# Patient Record
Sex: FEMALE
[De-identification: names, ages, dates, MRNs, and addresses within clinical notes are randomized per-mention and may not be internally consistent; named-entity substitution may affect disease eponyms.]

---

## 2021-10-21 ENCOUNTER — RESULT REVIEW (OUTPATIENT)
Age: 51
End: 2021-10-21

## 2022-09-28 PROBLEM — Z00.00 ENCOUNTER FOR PREVENTIVE HEALTH EXAMINATION: Status: ACTIVE | Noted: 2022-09-28

## 2022-10-05 ENCOUNTER — APPOINTMENT (OUTPATIENT)
Dept: RADIATION ONCOLOGY | Facility: CLINIC | Age: 52
End: 2022-10-05

## 2022-10-05 VITALS — WEIGHT: 140 LBS | RESPIRATION RATE: 18 BRPM | HEIGHT: 60 IN | BODY MASS INDEX: 27.48 KG/M2

## 2022-10-05 DIAGNOSIS — Z78.9 OTHER SPECIFIED HEALTH STATUS: ICD-10-CM

## 2022-10-05 PROCEDURE — 77263 THER RADIOLOGY TX PLNG CPLX: CPT

## 2022-10-05 PROCEDURE — 99443: CPT | Mod: 25

## 2022-10-05 RX ORDER — MULTIVIT-MIN/FOLIC/VIT K/LYCOP 400-300MCG
25 MCG TABLET ORAL
Refills: 0 | Status: ACTIVE | COMMUNITY

## 2022-10-05 RX ORDER — 70%ISOPROPYL ALCOHOL 0.7 ML/ML
70 SWAB TOPICAL
Refills: 0 | Status: ACTIVE | COMMUNITY

## 2022-10-05 NOTE — DISEASE MANAGEMENT
[Clinical] : TNM Stage: c [I] : I [FreeTextEntry4] : IDC Right breast [TTNM] : x [NTNM] : x [MTNM] : 0

## 2022-10-05 NOTE — REASON FOR VISIT
[Consideration of Curative Therapy] : consideration of curative therapy for [Breast Cancer] : breast cancer [Home] : at home, [unfilled] , at the time of the visit. [Medical Office: ([facility])___] : at the medical office located in  [Verbal consent obtained from patient] : the patient, [unfilled] [[employer] ] : provided by [employer]   [Interpreters_IDNumber] : [id number] [TWNoteComboBox1] : Peruvian

## 2022-10-05 NOTE — HISTORY OF PRESENT ILLNESS
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 \par \par Her biopsy pathology 10/21/21 includes DCIS and the invasive tumor is less than 1 millimeter. The dcis is high grade and involves lobules. After her initial bipsy, She has had ultrasound and bilateral breast MRI in 11/2021 which revealed a 2 centimeter right axillary lymph node with a questionable thickened cortex after a recent procedure, most likely reactive.\par \par 2/2022 -- right breast lumpectomy. DCIS. Stage [id number]. Triple Neg. \par 4/2022 --- chemotherapy with Dr Cline @ [facility]. \par \par 10/5/22 Today for radiation consult. No breast pain, chest pain, cough.

## 2022-10-10 ENCOUNTER — OUTPATIENT (OUTPATIENT)
Dept: OUTPATIENT SERVICES | Facility: HOSPITAL | Age: 52
LOS: 1 days | Discharge: HOME | End: 2022-10-10

## 2022-10-10 DIAGNOSIS — C50.811 MALIGNANT NEOPLASM OF OVERLAPPING SITES OF RIGHT FEMALE BREAST: ICD-10-CM

## 2022-10-10 PROCEDURE — 77333 RADIATION TREATMENT AID(S): CPT | Mod: 26

## 2022-10-10 PROCEDURE — 77290 THER RAD SIMULAJ FIELD CPLX: CPT | Mod: 26

## 2022-10-27 ENCOUNTER — NON-APPOINTMENT (OUTPATIENT)
Age: 52
End: 2022-10-27

## 2022-10-31 NOTE — REVIEW OF SYSTEMS
[Dysphagia: Grade 0] : Dysphagia: Grade 0 [Cough: Grade 0] : Cough: Grade 0 [Alopecia: Grade 0] : Alopecia: Grade 0 [Pruritus: Grade 0] : Pruritus: Grade 0 [Skin Atrophy: Grade 0] : Skin Atrophy: Grade 0 [Skin Hyperpigmentation: Grade 0] : Skin Hyperpigmentation: Grade 0 [Skin Induration: Grade 0] : Skin Induration: Grade 0 [Dermatitis Radiation: Grade 0] : Dermatitis Radiation: Grade 0 [Negative] : Allergic/Immunologic [de-identified] : h/o right breast lumpectomy in 2/2022

## 2022-10-31 NOTE — HISTORY OF PRESENT ILLNESS
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 \par \par Her biopsy pathology 10/21/21 includes DCIS and the invasive tumor is less than 1 millimeter. The dcis is high grade and involves lobules. After her initial bipsy, She has had ultrasound and bilateral breast MRI in 11/2021 which revealed a 2 centimeter right axillary lymph node with a questionable thickened cortex after a recent procedure, most likely reactive.\par \par 2/2022 -- right breast lumpectomy. DCIS. Stage [id number]. Triple Neg. \par 4/2022 --- chemotherapy with Dr Cline @ [facility]. \par \par 10/5/22 Today for radiation consult. No breast pain, chest pain, cough. \par \par 3/20 fractions of Radiation to right breast completed. No breast pain, chest pain, cough, dysphagia. Using Aquaphor cream over RT area.

## 2022-10-31 NOTE — REASON FOR VISIT
[Breast Cancer] : breast cancer [[employer] ] : provided by [employer]   [Routine On-Treatment] : a routine on-treatment visit for [Interpreters_IDNumber] : [id number] [TWNoteComboBox1] : Burundian

## 2022-11-01 ENCOUNTER — NON-APPOINTMENT (OUTPATIENT)
Age: 52
End: 2022-11-01

## 2022-11-06 NOTE — REASON FOR VISIT
[Routine On-Treatment] : a routine on-treatment visit for [Breast Cancer] : breast cancer [[employer] ] : provided by [employer]   [Interpreters_IDNumber] : [id number] [TWNoteComboBox1] : Russian

## 2022-11-06 NOTE — REVIEW OF SYSTEMS
[Dysphagia: Grade 0] : Dysphagia: Grade 0 [Cough: Grade 0] : Cough: Grade 0 [Alopecia: Grade 0] : Alopecia: Grade 0 [Pruritus: Grade 0] : Pruritus: Grade 0 [Skin Atrophy: Grade 0] : Skin Atrophy: Grade 0 [Skin Hyperpigmentation: Grade 0] : Skin Hyperpigmentation: Grade 0 [Skin Induration: Grade 0] : Skin Induration: Grade 0 [Dermatitis Radiation: Grade 0] : Dermatitis Radiation: Grade 0 [Negative] : Allergic/Immunologic [de-identified] : h/o right breast lumpectomy in 2/2022

## 2022-11-06 NOTE — DISEASE MANAGEMENT
[Clinical] : TNM Stage: c [I] : I [FreeTextEntry4] : IDC Right breast [TTNM] : x [NTNM] : x [MTNM] : 0 [de-identified] : 5240 cGy [de-identified] : right breast

## 2022-11-06 NOTE — HISTORY OF PRESENT ILLNESS
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 \par \par Her biopsy pathology 10/21/21 includes DCIS and the invasive tumor is less than 1 millimeter. The dcis is high grade and involves lobules. After her initial bipsy, She has had ultrasound and bilateral breast MRI in 11/2021 which revealed a 2 centimeter right axillary lymph node with a questionable thickened cortex after a recent procedure, most likely reactive.\par \par 2/2022 -- right breast lumpectomy. DCIS. Stage [id number]. Triple Neg. \par 4/2022 --- chemotherapy with Dr Cline @ [facility]. \par \par 10/5/22 Today for radiation consult. No breast pain, chest pain, cough. \par \par 7/20 fractions of Radiation to right breast completed. No breast pain, chest pain, cough, dysphagia. Using Aquaphor cream over RT area.

## 2022-11-10 ENCOUNTER — NON-APPOINTMENT (OUTPATIENT)
Age: 52
End: 2022-11-10

## 2022-11-12 NOTE — HISTORY OF PRESENT ILLNESS
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 \par \par Her biopsy pathology 10/21/21 includes DCIS and the invasive tumor is less than 1 millimeter. The dcis is high grade and involves lobules. After her initial bipsy, She has had ultrasound and bilateral breast MRI in 11/2021 which revealed a 2 centimeter right axillary lymph node with a questionable thickened cortex after a recent procedure, most likely reactive.\par \par 2/2022 -- right breast lumpectomy. DCIS. Stage [id number]. Triple Neg. \par 4/2022 --- chemotherapy with Dr Cline @ [facility]. \par \par 10/5/22 Today for radiation consult. No breast pain, chest pain, cough. \par \par 13/20 fractions of Radiation to right breast completed. No breast pain, chest pain, cough, dysphagia. Using Aquaphor cream over RT area.

## 2022-11-12 NOTE — DISEASE MANAGEMENT
[Clinical] : TNM Stage: c [I] : I [FreeTextEntry4] : IDC Right breast [TTNM] : x [NTNM] : x [MTNM] : 0 [de-identified] : 5240 cGy [de-identified] : right breast

## 2022-11-12 NOTE — REVIEW OF SYSTEMS
[Dysphagia: Grade 0] : Dysphagia: Grade 0 [Cough: Grade 0] : Cough: Grade 0 [Alopecia: Grade 0] : Alopecia: Grade 0 [Pruritus: Grade 0] : Pruritus: Grade 0 [Skin Atrophy: Grade 0] : Skin Atrophy: Grade 0 [Skin Hyperpigmentation: Grade 0] : Skin Hyperpigmentation: Grade 0 [Skin Induration: Grade 0] : Skin Induration: Grade 0 [Dermatitis Radiation: Grade 0] : Dermatitis Radiation: Grade 0 [Negative] : Allergic/Immunologic [de-identified] : h/o right breast lumpectomy in 2/2022

## 2022-11-12 NOTE — REASON FOR VISIT
[Routine On-Treatment] : a routine on-treatment visit for [Breast Cancer] : breast cancer [[employer] ] : provided by [employer]   [Interpreters_IDNumber] : [id number] [TWNoteComboBox1] : Slovak

## 2022-11-17 DIAGNOSIS — C50.919 MALIGNANT NEOPLASM OF UNSPECIFIED SITE OF UNSPECIFIED FEMALE BREAST: ICD-10-CM

## 2022-11-17 RX ORDER — SILVER SULFADIAZINE 10 MG/G
1 CREAM TOPICAL TWICE DAILY
Qty: 1 | Refills: 1 | Status: ACTIVE | COMMUNITY
Start: 2022-11-17 | End: 1900-01-01

## 2022-11-26 NOTE — HISTORY OF PRESENT ILLNESS
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 \par \par Her biopsy pathology 10/21/21 includes DCIS and the invasive tumor is less than 1 millimeter. The dcis is high grade and involves lobules. After her initial bipsy, She has had ultrasound and bilateral breast MRI in 11/2021 which revealed a 2 centimeter right axillary lymph node with a questionable thickened cortex after a recent procedure, most likely reactive.\par \par 2/2022 -- right breast lumpectomy. DCIS. Stage [id number]. Triple Neg. \par 4/2022 --- chemotherapy with Dr Cline @ [facility]. \par \par 10/5/22 Today for radiation consult. No breast pain, chest pain, cough. \par \par 18/20 fractions of Radiation to right breast completed. No breast pain, chest pain, cough, dysphagia. Using Aquaphor cream over RT area. Rx silvadene cream given.

## 2022-11-26 NOTE — DISEASE MANAGEMENT
[Clinical] : TNM Stage: c [I] : I [FreeTextEntry4] : IDC Right breast [TTNM] : x [NTNM] : x [MTNM] : 0 [de-identified] : 5240 cGy [de-identified] : right breast

## 2022-11-26 NOTE — REVIEW OF SYSTEMS
[Dysphagia: Grade 0] : Dysphagia: Grade 0 [Cough: Grade 0] : Cough: Grade 0 [Alopecia: Grade 0] : Alopecia: Grade 0 [Pruritus: Grade 0] : Pruritus: Grade 0 [Skin Atrophy: Grade 0] : Skin Atrophy: Grade 0 [Skin Hyperpigmentation: Grade 1 - Hyperpigmentation covering <10% BSA; no psychosocial impact] : Skin Hyperpigmentation: Grade 1 - Hyperpigmentation covering <10% BSA; no psychosocial impact [Skin Induration: Grade 0] : Skin Induration: Grade 0 [Dermatitis Radiation: Grade 1 - Faint erythema or dry desquamation] : Dermatitis Radiation: Grade 1 - Faint erythema or dry desquamation [Negative] : Allergic/Immunologic [de-identified] : h/o right breast lumpectomy in 2/2022

## 2022-12-23 ENCOUNTER — APPOINTMENT (OUTPATIENT)
Dept: RADIATION ONCOLOGY | Facility: CLINIC | Age: 52
End: 2022-12-23

## 2022-12-23 PROCEDURE — 99024 POSTOP FOLLOW-UP VISIT: CPT

## 2022-12-25 NOTE — REASON FOR VISIT
[Post-Treatment Evaluation] : post-treatment evaluation for [Breast Cancer] : breast cancer [[employer] ] : provided by [employer]   [Interpreters_IDNumber] : [id number] [TWNoteComboBox1] : Liberian

## 2022-12-25 NOTE — REVIEW OF SYSTEMS
[Negative] : Allergic/Immunologic [Dysphagia: Grade 0] : Dysphagia: Grade 0 [Cough: Grade 0] : Cough: Grade 0 [Alopecia: Grade 0] : Alopecia: Grade 0 [Pruritus: Grade 0] : Pruritus: Grade 0 [Skin Atrophy: Grade 0] : Skin Atrophy: Grade 0 [Skin Hyperpigmentation: Grade 1 - Hyperpigmentation covering <10% BSA; no psychosocial impact] : Skin Hyperpigmentation: Grade 1 - Hyperpigmentation covering <10% BSA; no psychosocial impact [Skin Induration: Grade 0] : Skin Induration: Grade 0 [Dermatitis Radiation: Grade 1 - Faint erythema or dry desquamation] : Dermatitis Radiation: Grade 1 - Faint erythema or dry desquamation [de-identified] : h/o right breast lumpectomy in 2/2022

## 2022-12-25 NOTE — DISEASE MANAGEMENT
[Clinical] : TNM Stage: c [I] : I [FreeTextEntry4] : IDC Right breast [TTNM] : x [NTNM] : x [MTNM] : 0 [de-identified] : right breast [de-identified] : 5240 cGy

## 2022-12-25 NOTE — PHYSICAL EXAM
[Normal] : oriented to person, place and time, the affect was normal, the mood was normal and not anxious [de-identified] : R breast better

## 2022-12-25 NOTE — HISTORY OF PRESENT ILLNESS
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 \par \par Her biopsy pathology 10/21/21 includes DCIS and the invasive tumor is less than 1 millimeter. The dcis is high grade and involves lobules. After her initial bipsy, She has had ultrasound and bilateral breast MRI in 11/2021 which revealed a 2 centimeter right axillary lymph node with a questionable thickened cortex after a recent procedure, most likely reactive.\par \par 2/2022 -- right breast lumpectomy. DCIS. Stage [id number]. Triple Neg. \par 4/2022 --- chemotherapy with Dr Cline @ [facility]. \par \par 10/5/22 Today for radiation consult. No breast pain, chest pain, cough. \par \par Last OTV note during radiation in 11/2022: 18/20 fractions of Radiation to right breast completed. No breast pain, chest pain, cough, dysphagia. Using Aquaphor cream over RT area. Rx silvadene cream given. \par \par 12/23/22 F/U. Pt completed 5240 cGy /20 Fx of RT to right breast in 11/2022. No breast pain, cough, dysphagia. Using silvadene cream for skin over right breast. Will see surgeon in 2/2023 as per Pt.

## 2023-04-12 ENCOUNTER — APPOINTMENT (OUTPATIENT)
Dept: RADIATION ONCOLOGY | Facility: CLINIC | Age: 53
End: 2023-04-12
Payer: MEDICAID

## 2023-04-12 PROCEDURE — 99214 OFFICE O/P EST MOD 30 MIN: CPT

## 2023-04-17 NOTE — REVIEW OF SYSTEMS
[Negative] : Allergic/Immunologic [Dysphagia: Grade 0] : Dysphagia: Grade 0 [Cough: Grade 0] : Cough: Grade 0 [Alopecia: Grade 0] : Alopecia: Grade 0 [Pruritus: Grade 0] : Pruritus: Grade 0 [Skin Atrophy: Grade 0] : Skin Atrophy: Grade 0 [Skin Hyperpigmentation: Grade 1 - Hyperpigmentation covering <10% BSA; no psychosocial impact] : Skin Hyperpigmentation: Grade 1 - Hyperpigmentation covering <10% BSA; no psychosocial impact [Skin Induration: Grade 0] : Skin Induration: Grade 0 [Dermatitis Radiation: Grade 1 - Faint erythema or dry desquamation] : Dermatitis Radiation: Grade 1 - Faint erythema or dry desquamation [de-identified] : h/o right breast lumpectomy in 2/2022

## 2023-04-17 NOTE — DISEASE MANAGEMENT
[Clinical] : TNM Stage: c [I] : I [FreeTextEntry4] : IDC Right breast [TTNM] : x [NTNM] : x [MTNM] : 0 [de-identified] : 5240 cGy [de-identified] : right breast

## 2023-04-17 NOTE — REASON FOR VISIT
[Post-Treatment Evaluation] : post-treatment evaluation for [Breast Cancer] : breast cancer [[employer] ] : provided by [employer]   [Interpreters_IDNumber] : [id number] [TWNoteComboBox1] : German

## 2023-04-17 NOTE — PHYSICAL EXAM
[Normal] : oriented to person, place and time, the affect was normal, the mood was normal and not anxious [FreeTextEntry1] : breast exam done today together with female chaperone.

## 2023-04-17 NOTE — HISTORY OF PRESENT ILLNESS
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 \par \par Her biopsy pathology 10/21/21 includes DCIS and the invasive tumor is less than 1 millimeter. The dcis is high grade and involves lobules. After her initial bipsy, She has had ultrasound and bilateral breast MRI in 11/2021 which revealed a 2 centimeter right axillary lymph node with a questionable thickened cortex after a recent procedure, most likely reactive.\par \par 2/2022 -- right breast lumpectomy. DCIS. Stage [id number]. Triple Neg. \par 4/2022 --- chemotherapy with Dr Cline @ [facility]. \par \par 10/5/22 Today for radiation consult. No breast pain, chest pain, cough. \par \par Last OTV note during radiation in 11/2022: 18/20 fractions of Radiation to right breast completed. No breast pain, chest pain, cough, dysphagia. Using Aquaphor cream over RT area. Rx silvadene cream given. \par \par 4/12/2023 F/U. Pt completed 5240 cGy /20 Fx of RT to right breast in 11/2022. No breast pain, cough, dysphagia. Using canlendula / silvadene cream for skin over right breast. Will see Med Onc Dr Cline soon for possible mammography.